# Patient Record
Sex: MALE
[De-identification: names, ages, dates, MRNs, and addresses within clinical notes are randomized per-mention and may not be internally consistent; named-entity substitution may affect disease eponyms.]

---

## 2020-07-08 ENCOUNTER — DASHBOARD ENCOUNTERS (OUTPATIENT)
Age: 65
End: 2020-07-08

## 2020-07-08 ENCOUNTER — OFFICE VISIT (OUTPATIENT)
Dept: URBAN - METROPOLITAN AREA CLINIC 92 | Facility: CLINIC | Age: 65
End: 2020-07-08
Payer: COMMERCIAL

## 2020-07-08 DIAGNOSIS — K59.01 CONSTIPATION: ICD-10-CM

## 2020-07-08 DIAGNOSIS — K21.9 GERD: ICD-10-CM

## 2020-07-08 DIAGNOSIS — Z12.11 SCREEN FOR COLON CANCER: ICD-10-CM

## 2020-07-08 PROCEDURE — 99244 OFF/OP CNSLTJ NEW/EST MOD 40: CPT | Performed by: INTERNAL MEDICINE

## 2020-07-08 NOTE — HPI-TODAY'S VISIT:
Pt seen on referral from Dr. Madrigal for constipation and abd discomfort. Started 2/2020 and is periumbilical. Dull pain which is intermittent. Worse with consitpation. Improved with BMs. Avgs one BM q4 days without laxatives. On dulcolax and senna prn. No GI bleeding, wt loss or diarrhea. Recent FOBT positive.   Also with chronic GERD >5 yrs. No dysphagia, melena, n/v. No NSAIDs. On prn nexium which resolves symptoms.   No previous EGD. Colonoscopy 10 yrs ago normal.

## 2020-07-20 ENCOUNTER — OFFICE VISIT (OUTPATIENT)
Dept: URBAN - METROPOLITAN AREA SURGERY CENTER 16 | Facility: SURGERY CENTER | Age: 65
End: 2020-07-20

## 2020-07-24 ENCOUNTER — TELEPHONE ENCOUNTER (OUTPATIENT)
Dept: URBAN - METROPOLITAN AREA CLINIC 5 | Facility: CLINIC | Age: 65
End: 2020-07-24

## 2020-07-24 NOTE — HPI-OTHER HISTORIES
Spoke with pt today regarding recent CT abd ordered by his PCP. Shows large mass in abd mesentery with likely metastatic lesions in the liver. He has already established with Onc and is currently pending bx. We have decided to defer endoscopic eval for now.

## 2020-08-27 ENCOUNTER — OUT OF OFFICE VISIT (OUTPATIENT)
Dept: URBAN - METROPOLITAN AREA MEDICAL CENTER 28 | Facility: MEDICAL CENTER | Age: 65
End: 2020-08-27
Payer: COMMERCIAL

## 2020-08-27 DIAGNOSIS — R71.0 DECREASED HEMOGLOBIN: ICD-10-CM

## 2020-08-27 DIAGNOSIS — C49.A9 GASTROINTESTINAL STROMAL TUMOR OF OTHER SITES: ICD-10-CM

## 2020-08-27 DIAGNOSIS — K92.1 BLACK STOOL: ICD-10-CM

## 2020-08-27 DIAGNOSIS — E87.6 HYPOKALEMIA: ICD-10-CM

## 2020-08-27 PROCEDURE — 99232 SBSQ HOSP IP/OBS MODERATE 35: CPT | Performed by: INTERNAL MEDICINE

## 2020-08-27 PROCEDURE — 99254 IP/OBS CNSLTJ NEW/EST MOD 60: CPT | Performed by: INTERNAL MEDICINE

## 2020-08-28 ENCOUNTER — OUT OF OFFICE VISIT (OUTPATIENT)
Dept: URBAN - METROPOLITAN AREA MEDICAL CENTER 28 | Facility: MEDICAL CENTER | Age: 65
End: 2020-08-28
Payer: COMMERCIAL

## 2020-08-28 DIAGNOSIS — K92.1 BLACK STOOL: ICD-10-CM

## 2020-08-28 DIAGNOSIS — C49.A9 GASTROINTESTINAL STROMAL TUMOR OF OTHER SITES: ICD-10-CM

## 2020-08-28 DIAGNOSIS — K29.60 ADENOPAPILLOMATOSIS GASTRICA: ICD-10-CM

## 2020-08-28 PROCEDURE — G9937 DIG OR SURV COLSCO: HCPCS | Performed by: INTERNAL MEDICINE

## 2020-08-28 PROCEDURE — 43239 EGD BIOPSY SINGLE/MULTIPLE: CPT | Performed by: INTERNAL MEDICINE

## 2020-08-28 PROCEDURE — 45378 DIAGNOSTIC COLONOSCOPY: CPT | Performed by: INTERNAL MEDICINE

## 2020-08-28 PROCEDURE — 99233 SBSQ HOSP IP/OBS HIGH 50: CPT | Performed by: INTERNAL MEDICINE
